# Patient Record
Sex: MALE | Race: WHITE | Employment: FULL TIME | URBAN - METROPOLITAN AREA
[De-identification: names, ages, dates, MRNs, and addresses within clinical notes are randomized per-mention and may not be internally consistent; named-entity substitution may affect disease eponyms.]

---

## 2018-03-20 ENCOUNTER — APPOINTMENT (OUTPATIENT)
Dept: OCCUPATIONAL MEDICINE | Age: 33
End: 2018-03-20
Attending: FAMILY MEDICINE

## 2022-01-05 ENCOUNTER — HOSPITAL ENCOUNTER (EMERGENCY)
Facility: HOSPITAL | Age: 37
Discharge: HOME | End: 2022-01-05
Attending: EMERGENCY MEDICINE
Payer: COMMERCIAL

## 2022-01-05 ENCOUNTER — APPOINTMENT (OUTPATIENT)
Dept: GENERAL RADIOLOGY | Facility: HOSPITAL | Age: 37
End: 2022-01-05
Attending: EMERGENCY MEDICINE
Payer: COMMERCIAL

## 2022-01-05 VITALS
HEIGHT: 71 IN | TEMPERATURE: 97 F | RESPIRATION RATE: 18 BRPM | DIASTOLIC BLOOD PRESSURE: 97 MMHG | BODY MASS INDEX: 32.2 KG/M2 | WEIGHT: 230 LBS | OXYGEN SATURATION: 96 % | HEART RATE: 91 BPM | SYSTOLIC BLOOD PRESSURE: 143 MMHG

## 2022-01-05 DIAGNOSIS — J18.9 COMMUNITY ACQUIRED PNEUMONIA, UNSPECIFIED LATERALITY: Primary | ICD-10-CM

## 2022-01-05 PROCEDURE — 99284 EMERGENCY DEPT VISIT MOD MDM: CPT

## 2022-01-05 PROCEDURE — 71045 X-RAY EXAM CHEST 1 VIEW: CPT | Performed by: EMERGENCY MEDICINE

## 2022-01-05 RX ORDER — LEVOFLOXACIN 750 MG/1
750 TABLET ORAL DAILY
Qty: 10 TABLET | Refills: 0 | Status: SHIPPED | OUTPATIENT
Start: 2022-01-05 | End: 2022-01-15

## 2022-01-05 NOTE — ED PROVIDER NOTES
Patient Seen in: [facility] AND [facility] Emergency Department      History   Patient presents with:  Cough/URI    Stated Complaint: URI    Subjective:   HPI    Pt is 32 yo unvaccinated male who p/w cough x 2 days.  States exposed to covid \"a week or so\" ago a results of the test are available. -Advised patient to inform their immediate household/contacts that they may wish to be tested and quarantine as well. Reviewed locations and people they have been in contact with in the past two weeks.   -Reviewed the sig

## 2022-01-06 LAB — SARS-COV-2 RNA RESP QL NAA+PROBE: DETECTED

## 2023-03-27 ENCOUNTER — OFFICE VISIT (OUTPATIENT)
Dept: INTERNAL MEDICINE CLINIC | Facility: CLINIC | Age: 38
End: 2023-03-27
Payer: COMMERCIAL

## 2023-03-27 VITALS
HEIGHT: 71 IN | OXYGEN SATURATION: 98 % | SYSTOLIC BLOOD PRESSURE: 148 MMHG | WEIGHT: 249 LBS | DIASTOLIC BLOOD PRESSURE: 92 MMHG | HEART RATE: 110 BPM | BODY MASS INDEX: 34.86 KG/M2 | RESPIRATION RATE: 17 BRPM

## 2023-03-27 DIAGNOSIS — E66.09 CLASS 1 OBESITY DUE TO EXCESS CALORIES WITH SERIOUS COMORBIDITY AND BODY MASS INDEX (BMI) OF 34.0 TO 34.9 IN ADULT: ICD-10-CM

## 2023-03-27 DIAGNOSIS — I10 PRIMARY HYPERTENSION: ICD-10-CM

## 2023-03-27 DIAGNOSIS — F41.1 GENERALIZED ANXIETY DISORDER: ICD-10-CM

## 2023-03-27 DIAGNOSIS — E78.2 MIXED HYPERLIPIDEMIA: ICD-10-CM

## 2023-03-27 DIAGNOSIS — G89.29 CHRONIC BILATERAL LOW BACK PAIN WITHOUT SCIATICA: ICD-10-CM

## 2023-03-27 DIAGNOSIS — M54.50 CHRONIC BILATERAL LOW BACK PAIN WITHOUT SCIATICA: ICD-10-CM

## 2023-03-27 DIAGNOSIS — K21.9 GASTROESOPHAGEAL REFLUX DISEASE WITHOUT ESOPHAGITIS: ICD-10-CM

## 2023-03-27 DIAGNOSIS — R41.840 DIFFICULTY CONCENTRATING: ICD-10-CM

## 2023-03-27 DIAGNOSIS — Z00.00 HEALTH MAINTENANCE EXAMINATION: Primary | ICD-10-CM

## 2023-03-27 DIAGNOSIS — R19.5 LOOSE STOOLS: ICD-10-CM

## 2023-03-27 PROBLEM — E66.811 CLASS 1 OBESITY DUE TO EXCESS CALORIES WITH SERIOUS COMORBIDITY AND BODY MASS INDEX (BMI) OF 34.0 TO 34.9 IN ADULT: Status: ACTIVE | Noted: 2023-03-27

## 2023-03-27 RX ORDER — LOSARTAN POTASSIUM 25 MG/1
25 TABLET ORAL DAILY
Qty: 90 TABLET | Refills: 0 | Status: SHIPPED | OUTPATIENT
Start: 2023-03-27

## 2023-03-27 RX ORDER — FAMOTIDINE 20 MG/1
20 TABLET, FILM COATED ORAL 2 TIMES DAILY
COMMUNITY

## 2023-03-27 NOTE — ASSESSMENT & PLAN NOTE
Patient with intermittent nausea and loose stools. This has been going on for at least a year. Possible history of IBS seen on prior chart review. Advise monitoring for food triggers for now  Avoid dairy products. Needs to get anxiety under better control.   Check labs such as CBC, CMP, celiac screen, TSH  I will refer to GI for further evaluation

## 2023-03-27 NOTE — ASSESSMENT & PLAN NOTE
Patient with a history of chronic lower back pain and intermittent lower extremity muscle spasms associated with the lower back pain. He reports that he has had a history of laminectomy as well as TLIF surgery. Pain is overall controlled at this time. Can use Tylenol Advil as needed, however he is not needing. We will refer to physical therapy at this time. Follow-up as needed.

## 2023-03-27 NOTE — ASSESSMENT & PLAN NOTE
Exercise and diet advised. CBC, CMP, lipid panel, Hba1c, TSH, HIV screen, hepatitis C screen  Flu shot today - declines. Advanced directive information provided. Advised COVID vaccine - declines.

## 2023-03-27 NOTE — ASSESSMENT & PLAN NOTE
Blood pressure elevated in the office, known history of hypertension not on any medications. Start losartan 25 mg daily. If blood pressure greater than 140/90 after 5 days advise increasing dosage to 50 mg daily. Follow-up with me in 2 weeks to reassess.

## 2023-03-27 NOTE — PATIENT INSTRUCTIONS
PATIENT INSTRUCTIONS    Thank you for seeing me today, it was a pleasure taking care of you. Please check out at the  and schedule a follow up appointment. Return in about 2 weeks (around 4/10/2023) for follow up. Please get your labs drawn - you may need to schedule a lab appointment if this was not completed at your recent doctor's visit. The following imaging studies were ordered: None  Please also follow up with the following specialists: Physical therapy, GI   Please fill out the advance directive information (power of  documents) and bring a copy to our clinic. Please monitor your GI symptoms, please try to keep a food log to see if there are any potential triggers   Consider cutting out all dairy products for the next 2 weeks   Monitor stress and anxiety level   Please monitor for a call from [facility]. Someone will reach out to you to speak with you. If you do not hear a response from [facility] in 48 hours, please call them at [phone]. Ask them for referrals to see a specialist for ADHD evaluation. After you are evaluated by the specialist, if you do have a diagnosis consistent with ADHD, I can moving forward help you manage your medications. I require a letter stating from the specialist that you have a diagnosis consistent with ADHD in order to move forward. Losartan 25 mg daily  After 5 days of taking the medicaiton, if blood pressure consistently >140/90 start taking two (total 50 mg daily)   Please monitor your blood pressures at home using a blood pressure machine with a cuff that goes over your arm (not your wrist). If you do not have a blood pressure machine, you can consider purchasing an [employer] blood pressure machine (available on [address]). Please check your blood pressures at once a day and record your blood pressures in a log. Please bring your blood pressure log to your next doctor's appointment with me.   Please also bring your blood pressure machine to your next doctor's appointment to see if it is accurate.         Best,   Dr. Poon Found

## 2023-03-27 NOTE — ASSESSMENT & PLAN NOTE
Patient with what seems consistent with generalized anxiety disorder-he reports that he is anxious at baseline and has had recent stressors that have been causing him to feel more anxious. Also with difficulty focusing. Will refer to psychiatry-STEM program to evaluate for ADHD and initiate medication if appropriate. Discussed with patient that he may benefit from getting his anxiety under better control-he will think about options for treatment such as therapy or possible medication.   Follow-up in 2 weeks to reassess

## 2023-03-27 NOTE — ASSESSMENT & PLAN NOTE
Patient with severe acid reflux. Takes famotidine on a regular basis. Discussed that he may benefit from H. pylori testing but he is unable to come off of antacids.   We will refer to GI for further evaluation

## 2023-03-29 PROBLEM — R73.03 PREDIABETES: Status: ACTIVE | Noted: 2023-03-29

## 2023-03-29 LAB
ABSOLUTE BASOPHILS: 73 CELLS/UL (ref 0–200)
ABSOLUTE EOSINOPHILS: 454 CELLS/UL (ref 15–500)
ABSOLUTE LYMPHOCYTES: 1960 CELLS/UL (ref 850–3900)
ABSOLUTE MONOCYTES: 729 CELLS/UL (ref 200–950)
ABSOLUTE NEUTROPHILS: 4884 CELLS/UL (ref 1500–7800)
ALBUMIN/GLOBULIN RATIO: 1.6 (CALC) (ref 1–2.5)
ALBUMIN: 4.5 G/DL (ref 3.6–5.1)
ALKALINE PHOSPHATASE: 102 U/L (ref 36–130)
ALT: 28 U/L (ref 9–46)
AST: 18 U/L (ref 10–40)
BASOPHILS: 0.9 %
BILIRUBIN, TOTAL: 0.3 MG/DL (ref 0.2–1.2)
BUN: 11 MG/DL (ref 7–25)
CALCIUM: 9.7 MG/DL (ref 8.6–10.3)
CARBON DIOXIDE: 26 MMOL/L (ref 20–32)
CHLORIDE: 104 MMOL/L (ref 98–110)
CHOL/HDLC RATIO: 5.2 (CALC)
CHOLESTEROL, TOTAL: 219 MG/DL
CREATININE: 1.04 MG/DL (ref 0.6–1.26)
EGFR: 96 ML/MIN/1.73M2
EOSINOPHILS: 5.6 %
GLOBULIN: 2.8 G/DL (CALC) (ref 1.9–3.7)
GLUCOSE: 118 MG/DL (ref 65–99)
HDL CHOLESTEROL: 42 MG/DL
HEMATOCRIT: 46.3 % (ref 38.5–50)
HEMOGLOBIN A1C: 5.9 % OF TOTAL HGB
HEMOGLOBIN: 14.8 G/DL (ref 13.2–17.1)
IMMUNOGLOBULIN A: 435 MG/DL (ref 47–310)
LDL-CHOLESTEROL: 137 MG/DL (CALC)
LYMPHOCYTES: 24.2 %
MCH: 28 PG (ref 27–33)
MCHC: 32 G/DL (ref 32–36)
MCV: 87.7 FL (ref 80–100)
MONOCYTES: 9 %
MPV: 11.5 FL (ref 7.5–12.5)
NEUTROPHILS: 60.3 %
NON-HDL CHOLESTEROL: 177 MG/DL (CALC)
PLATELET COUNT: 343 THOUSAND/UL (ref 140–400)
POTASSIUM: 4.6 MMOL/L (ref 3.5–5.3)
PROTEIN, TOTAL: 7.3 G/DL (ref 6.1–8.1)
RDW: 13 % (ref 11–15)
RED BLOOD CELL COUNT: 5.28 MILLION/UL (ref 4.2–5.8)
SIGNAL TO CUT-OFF: 0.08
SODIUM: 139 MMOL/L (ref 135–146)
TISSUE TRANSGLUTAMINASE$ANTIBODY, IGA: <1 U/ML
TRIGLYCERIDES: 257 MG/DL
TSH W/REFLEX TO FT4: 1.02 MIU/L (ref 0.4–4.5)
WHITE BLOOD CELL COUNT: 8.1 THOUSAND/UL (ref 3.8–10.8)

## 2023-04-11 ENCOUNTER — OFFICE VISIT (OUTPATIENT)
Dept: INTERNAL MEDICINE CLINIC | Facility: CLINIC | Age: 38
End: 2023-04-11
Payer: COMMERCIAL

## 2023-04-11 VITALS
SYSTOLIC BLOOD PRESSURE: 132 MMHG | BODY MASS INDEX: 34.86 KG/M2 | DIASTOLIC BLOOD PRESSURE: 82 MMHG | RESPIRATION RATE: 16 BRPM | OXYGEN SATURATION: 99 % | WEIGHT: 249 LBS | HEART RATE: 97 BPM | HEIGHT: 71 IN

## 2023-04-11 DIAGNOSIS — E66.09 CLASS 1 OBESITY DUE TO EXCESS CALORIES WITH SERIOUS COMORBIDITY AND BODY MASS INDEX (BMI) OF 34.0 TO 34.9 IN ADULT: ICD-10-CM

## 2023-04-11 DIAGNOSIS — I10 PRIMARY HYPERTENSION: Primary | ICD-10-CM

## 2023-04-11 DIAGNOSIS — R19.5 LOOSE STOOLS: ICD-10-CM

## 2023-04-11 DIAGNOSIS — K21.9 GASTROESOPHAGEAL REFLUX DISEASE WITHOUT ESOPHAGITIS: ICD-10-CM

## 2023-04-11 DIAGNOSIS — R73.03 PREDIABETES: ICD-10-CM

## 2023-04-11 DIAGNOSIS — R41.840 DIFFICULTY CONCENTRATING: ICD-10-CM

## 2023-04-11 DIAGNOSIS — E78.2 MIXED HYPERLIPIDEMIA: ICD-10-CM

## 2023-04-11 DIAGNOSIS — F41.1 GENERALIZED ANXIETY DISORDER: ICD-10-CM

## 2023-04-11 DIAGNOSIS — R00.0 TACHYCARDIA: ICD-10-CM

## 2023-04-11 LAB
ATRIAL RATE: 89 BPM
P AXIS: 55 DEGREES
P-R INTERVAL: 124 MS
Q-T INTERVAL: 350 MS
QRS DURATION: 94 MS
QTC CALCULATION (BEZET): 425 MS
R AXIS: 52 DEGREES
T AXIS: 36 DEGREES
VENTRICULAR RATE: 89 BPM

## 2023-04-11 PROCEDURE — 93000 ELECTROCARDIOGRAM COMPLETE: CPT | Performed by: FAMILY MEDICINE

## 2023-04-11 PROCEDURE — 3008F BODY MASS INDEX DOCD: CPT | Performed by: FAMILY MEDICINE

## 2023-04-11 PROCEDURE — 3079F DIAST BP 80-89 MM HG: CPT | Performed by: FAMILY MEDICINE

## 2023-04-11 PROCEDURE — 3075F SYST BP GE 130 - 139MM HG: CPT | Performed by: FAMILY MEDICINE

## 2023-04-11 PROCEDURE — 99214 OFFICE O/P EST MOD 30 MIN: CPT | Performed by: FAMILY MEDICINE

## 2023-04-11 RX ORDER — ESCITALOPRAM OXALATE 5 MG/1
5 TABLET ORAL DAILY
Qty: 90 TABLET | Refills: 0 | Status: SHIPPED | OUTPATIENT
Start: 2023-04-11

## 2023-04-11 RX ORDER — LOSARTAN POTASSIUM 25 MG/1
25 TABLET ORAL DAILY
Qty: 90 TABLET | Refills: 3 | Status: SHIPPED | OUTPATIENT
Start: 2023-04-11

## 2023-04-11 NOTE — PATIENT INSTRUCTIONS
PATIENT INSTRUCTIONS    Thank you for seeing me today, it was a pleasure taking care of you. Please check out at the  and schedule a follow up appointment. Return in about 1 month (around 5/11/2023) for follow up - ok for video visit. Get in touch with GI ---  Abran Gambino MD [address] [place] [address] [phone]     Focus on cutting back dairy for now  Continue lifestyle changes for acid reflux  Consider getting in touch with Natalia Shipley for evaluation of ADHD   Start escitalopram 5 mg daily   Focus on diet and exercise   [employer]. com  Monitor heart rate for now    Best,  Dr. Brie Malave

## 2023-04-11 NOTE — ASSESSMENT & PLAN NOTE
Patient with what seems consistent with generalized anxiety disorder-he reports that he is anxious at baseline and has had recent stressors that have been causing him to feel more anxious. Interested in medication therapy - start escitalopram 5 mg daily. Discussed that he can consider therapy in the future as well. Also with difficulty focusing. Referred to psychiatry-STEM program to evaluate for ADHD and initiate medication if appropriate.   Follow-up in 4 weeks to reassess

## 2023-04-11 NOTE — ASSESSMENT & PLAN NOTE
Check EKG today. Could be anxiety related. Monitor heart rate at home.    Consider cardiology evaluation if persistent

## 2023-04-11 NOTE — ASSESSMENT & PLAN NOTE
Patient with intermittent nausea and loose stools. This has been going on for at least a year. Possible history of IBS seen on prior chart review. Advise monitoring for food triggers for now  Avoid dairy products. Needs to get anxiety under better control.   I will refer to GI for further evaluation

## 2023-05-15 ENCOUNTER — OFFICE VISIT (OUTPATIENT)
Dept: INTERNAL MEDICINE CLINIC | Facility: CLINIC | Age: 38
End: 2023-05-15
Payer: COMMERCIAL

## 2023-05-15 VITALS
RESPIRATION RATE: 16 BRPM | OXYGEN SATURATION: 99 % | DIASTOLIC BLOOD PRESSURE: 86 MMHG | BODY MASS INDEX: 32.62 KG/M2 | HEART RATE: 103 BPM | SYSTOLIC BLOOD PRESSURE: 136 MMHG | WEIGHT: 233 LBS | HEIGHT: 71 IN

## 2023-05-15 DIAGNOSIS — R00.0 TACHYCARDIA: ICD-10-CM

## 2023-05-15 DIAGNOSIS — I10 PRIMARY HYPERTENSION: Primary | ICD-10-CM

## 2023-05-15 DIAGNOSIS — R19.5 LOOSE STOOLS: ICD-10-CM

## 2023-05-15 DIAGNOSIS — R41.840 DIFFICULTY CONCENTRATING: ICD-10-CM

## 2023-05-15 DIAGNOSIS — F41.1 GENERALIZED ANXIETY DISORDER: ICD-10-CM

## 2023-05-15 PROCEDURE — 3079F DIAST BP 80-89 MM HG: CPT | Performed by: FAMILY MEDICINE

## 2023-05-15 PROCEDURE — 3075F SYST BP GE 130 - 139MM HG: CPT | Performed by: FAMILY MEDICINE

## 2023-05-15 PROCEDURE — 3008F BODY MASS INDEX DOCD: CPT | Performed by: FAMILY MEDICINE

## 2023-05-15 PROCEDURE — 99214 OFFICE O/P EST MOD 30 MIN: CPT | Performed by: FAMILY MEDICINE

## 2023-05-15 RX ORDER — ESCITALOPRAM OXALATE 10 MG/1
10 TABLET ORAL DAILY
Qty: 90 TABLET | Refills: 0 | Status: SHIPPED | OUTPATIENT
Start: 2023-05-15

## 2023-05-15 NOTE — PATIENT INSTRUCTIONS
PATIENT INSTRUCTIONS    Thank you for seeing me today, it was a pleasure taking care of you. Please check out at the  and schedule a follow up appointment. Return in about 1 month (around 6/15/2023) for follow up.   Can be a video visit if desired   Increase escitalopram to 10 mg daily   Monitor side effects - libido  Continue losartan 25 mg - blood pressure  Consider therapy  Consider seeing [facility] for evaluation of ADHD     [place],  Dr. Rich Marvin

## 2023-05-15 NOTE — ASSESSMENT & PLAN NOTE
Patient with intermittent nausea and loose stools. This has been going on for at least a year and finally starting to improve at this time with anxiety control.   Suspect IBS

## 2023-05-15 NOTE — ASSESSMENT & PLAN NOTE
Anxiety is improving at this time. Increase escitalopram to 10 mg daily. Monitor libido for now- if significantly decreased consider decreasing escitalopram back to 5 mg and starting buspirone. Consider therapy. With difficulty focusing-has been referred to [facility] navigator for [employer] evaluation.   Follow-up in 4 weeks to reassess

## 2023-05-15 NOTE — ASSESSMENT & PLAN NOTE
Anxiety is improving at this time. Increase escitalopram to 10 mg daily. Monitor libido for now- if significantly decreased consider decreasing escitalopram back to 5 mg and starting buspirone. Consider therapy. With difficulty focusing-has been referred to [facility] for [employer] evaluation.   Follow-up in 4 weeks to reassess

## 2023-06-01 ENCOUNTER — TELEPHONE (OUTPATIENT)
Dept: INTERNAL MEDICINE CLINIC | Facility: CLINIC | Age: 38
End: 2023-06-01

## 2023-06-01 NOTE — TELEPHONE ENCOUNTER
Patient called as he was taken by ambulance on Monday, 5/29. His sciatica was so painful he couldn't stand. Has a bulging disk. That is why the ambulance was called. He has an appointment to see a surgeon next Wednesday. Patient is asking for a call back from the nurse. He is concerned with no mobility in his leg that he could form a blood clot. Doesn't want to go back to the ER if possible.

## 2023-06-01 NOTE — TELEPHONE ENCOUNTER
Call back to patient. Please see TE below. Patient went to ED for severe sciatic pain and states he is having surgery on his back for bulging disc next week. Patient states he is concerned that he will get a blood clot on his leg that is not as mobile as the other. States can bear weight but patient mostly limps when walking. Please advise. Thank you.

## 2023-06-01 NOTE — TELEPHONE ENCOUNTER
Thanks for the update. No need for anticoagulation at this time. Can see his surgeon for his chronic back issues, but I suspect he would likely benefit from physical therapy. I have referred him to PT in the past, you can let him know that he can feel free to schedule an appointment.     Thanks,  Jona Dakins

## 2023-06-15 ENCOUNTER — OFFICE VISIT (OUTPATIENT)
Dept: INTERNAL MEDICINE CLINIC | Facility: CLINIC | Age: 38
End: 2023-06-15
Payer: COMMERCIAL

## 2023-06-15 VITALS
WEIGHT: 229 LBS | DIASTOLIC BLOOD PRESSURE: 80 MMHG | SYSTOLIC BLOOD PRESSURE: 134 MMHG | BODY MASS INDEX: 32.06 KG/M2 | HEART RATE: 103 BPM | RESPIRATION RATE: 17 BRPM | HEIGHT: 71 IN | OXYGEN SATURATION: 99 %

## 2023-06-15 DIAGNOSIS — R00.0 TACHYCARDIA: ICD-10-CM

## 2023-06-15 DIAGNOSIS — M54.40 CHRONIC BILATERAL LOW BACK PAIN WITH SCIATICA, SCIATICA LATERALITY UNSPECIFIED: ICD-10-CM

## 2023-06-15 DIAGNOSIS — Z01.818 PREOPERATIVE EXAMINATION: Primary | ICD-10-CM

## 2023-06-15 DIAGNOSIS — K21.9 GASTROESOPHAGEAL REFLUX DISEASE WITHOUT ESOPHAGITIS: ICD-10-CM

## 2023-06-15 DIAGNOSIS — F41.1 GENERALIZED ANXIETY DISORDER: ICD-10-CM

## 2023-06-15 DIAGNOSIS — G89.29 CHRONIC BILATERAL LOW BACK PAIN WITH SCIATICA, SCIATICA LATERALITY UNSPECIFIED: ICD-10-CM

## 2023-06-15 DIAGNOSIS — I10 PRIMARY HYPERTENSION: ICD-10-CM

## 2023-06-15 DIAGNOSIS — E78.2 MIXED HYPERLIPIDEMIA: ICD-10-CM

## 2023-06-15 PROBLEM — M54.41 CHRONIC BILATERAL LOW BACK PAIN WITH SCIATICA: Status: ACTIVE | Noted: 2023-03-27

## 2023-06-15 PROBLEM — M54.42 CHRONIC BILATERAL LOW BACK PAIN WITH SCIATICA: Status: ACTIVE | Noted: 2023-03-27

## 2023-06-15 LAB
ATRIAL RATE: 103 BPM
P AXIS: 66 DEGREES
P-R INTERVAL: 124 MS
Q-T INTERVAL: 336 MS
QRS DURATION: 86 MS
QTC CALCULATION (BEZET): 440 MS
R AXIS: 51 DEGREES
T AXIS: 43 DEGREES
VENTRICULAR RATE: 103 BPM

## 2023-06-15 PROCEDURE — 93000 ELECTROCARDIOGRAM COMPLETE: CPT | Performed by: FAMILY MEDICINE

## 2023-06-15 PROCEDURE — 3079F DIAST BP 80-89 MM HG: CPT | Performed by: FAMILY MEDICINE

## 2023-06-15 PROCEDURE — 3075F SYST BP GE 130 - 139MM HG: CPT | Performed by: FAMILY MEDICINE

## 2023-06-15 PROCEDURE — 3008F BODY MASS INDEX DOCD: CPT | Performed by: FAMILY MEDICINE

## 2023-06-15 PROCEDURE — 99214 OFFICE O/P EST MOD 30 MIN: CPT | Performed by: FAMILY MEDICINE

## 2023-06-15 RX ORDER — HYDROCODONE BITARTRATE AND ACETAMINOPHEN 5; 325 MG/1; MG/1
1 TABLET ORAL EVERY 6 HOURS PRN
Qty: 15 TABLET | Refills: 0 | Status: SHIPPED | OUTPATIENT
Start: 2023-06-15

## 2023-06-15 NOTE — ASSESSMENT & PLAN NOTE
Patient with a history of chronic lower back pain. He reports that he has had a history of laminectomy as well as TLIF surgery. Pain worsened recently and he reports that the bulging disc is worse at this time. Now with plans for surgery. Holding NSAIDs prior to surgery. Advised using tylenol 500 mg every 6 hours. Heating pad, lidocaine patches as needed. I will prescribe him norco 5-325 mg to use as needed for break through pain in substitution of the tylenol. #15 prescribed.

## 2023-06-15 NOTE — ASSESSMENT & PLAN NOTE
Patient with severe acid reflux. Takes famotidine on a regular basis. Discussed that he may benefit from H. pylori testing but he is unable to come off of antacids. Referred to GI for further evaluation previously. Hold famotidine prior to surgery.

## 2023-06-15 NOTE — ASSESSMENT & PLAN NOTE
EKG in the office showing sinus tachycardia. Has had normal EKG with normal rate in the recent past.   Suspect this is secondary to pain at this time. Monitor.

## 2023-06-15 NOTE — PATIENT INSTRUCTIONS
PATIENT INSTRUCTIONS    Thank you for seeing me today, it was a pleasure taking care of you. Please check out at the  and schedule a follow up appointment. Return if symptoms worsen or fail to improve.   Take tylenol 500 mg every 6 hours for pain  Avoid NSAIDs as advised by your surgery team  [facility] - can substitute for tylenol if pain is severe  Losartan - hold night prior to surgery   Lexapro - can hold night prior to surgery     Best,  Dr. Poon Found

## 2023-06-15 NOTE — ASSESSMENT & PLAN NOTE
Anxiety is improving. Doing well on escitalopram to 10 mg daily - continue  Monitor libido for now- if significantly decreased consider decreasing escitalopram back to 5 mg and starting buspirone. Consider therapy. With difficulty focusing-has been referred previously to [address] [place] [address] for [employer] evaluation.

## 2023-06-15 NOTE — ASSESSMENT & PLAN NOTE
Blood pressures are typically controlled with losartan 25 mg daily. Can hold night prior to surgery.

## 2023-06-20 LAB
ABSOLUTE BASOPHILS: 52 CELLS/UL (ref 0–200)
ABSOLUTE EOSINOPHILS: 287 CELLS/UL (ref 15–500)
ABSOLUTE LYMPHOCYTES: 2184 CELLS/UL (ref 850–3900)
ABSOLUTE MONOCYTES: 853 CELLS/UL (ref 200–950)
ABSOLUTE NEUTROPHILS: 5324 CELLS/UL (ref 1500–7800)
ALBUMIN/GLOBULIN RATIO: 1.4 (CALC) (ref 1–2.5)
ALBUMIN: 4 G/DL (ref 3.6–5.1)
ALKALINE PHOSPHATASE: 88 U/L (ref 36–130)
ALT: 30 U/L (ref 9–46)
APPEARANCE: CLEAR
AST: 16 U/L (ref 10–40)
BASOPHILS: 0.6 %
BILIRUBIN, TOTAL: 0.2 MG/DL (ref 0.2–1.2)
BILIRUBIN: NEGATIVE
BUN: 16 MG/DL (ref 7–25)
CALCIUM: 9.1 MG/DL (ref 8.6–10.3)
CARBON DIOXIDE: 28 MMOL/L (ref 20–32)
CHLORIDE: 105 MMOL/L (ref 98–110)
COLOR: YELLOW
CREATININE: 0.95 MG/DL (ref 0.6–1.26)
EGFR: 106 ML/MIN/1.73M2
EOSINOPHILS: 3.3 %
GLOBULIN: 2.8 G/DL (CALC) (ref 1.9–3.7)
GLUCOSE: 111 MG/DL (ref 65–139)
GLUCOSE: NEGATIVE
HEMATOCRIT: 42.3 % (ref 38.5–50)
HEMOGLOBIN: 13.4 G/DL (ref 13.2–17.1)
INR: 0.9
KETONES: NEGATIVE
LEUKOCYTE ESTERASE: NEGATIVE
LYMPHOCYTES: 25.1 %
MCH: 28.2 PG (ref 27–33)
MCHC: 31.7 G/DL (ref 32–36)
MCV: 89.1 FL (ref 80–100)
MONOCYTES: 9.8 %
MPV: 10.7 FL (ref 7.5–12.5)
NEUTROPHILS: 61.2 %
NITRITE: NEGATIVE
OCCULT BLOOD: NEGATIVE
PARTIAL THROMBOPLASTIN$TIME, ACTIVATED: 25 SEC (ref 23–32)
PH: 6.5 (ref 5–8)
PLATELET COUNT: 328 THOUSAND/UL (ref 140–400)
POTASSIUM: 4.5 MMOL/L (ref 3.5–5.3)
PROTEIN, TOTAL: 6.8 G/DL (ref 6.1–8.1)
PROTEIN: NEGATIVE
PT: 9.8 SEC (ref 9–11.5)
RDW: 14.7 % (ref 11–15)
RED BLOOD CELL COUNT: 4.75 MILLION/UL (ref 4.2–5.8)
SODIUM: 139 MMOL/L (ref 135–146)
SPECIFIC GRAVITY: 1.02 (ref 1–1.03)
WHITE BLOOD CELL COUNT: 8.7 THOUSAND/UL (ref 3.8–10.8)

## 2023-09-03 DIAGNOSIS — F41.1 GENERALIZED ANXIETY DISORDER: ICD-10-CM

## 2023-09-05 RX ORDER — ESCITALOPRAM OXALATE 10 MG/1
10 TABLET ORAL DAILY
Qty: 90 TABLET | Refills: 3 | Status: SHIPPED | OUTPATIENT
Start: 2023-09-05

## 2023-09-05 NOTE — TELEPHONE ENCOUNTER
A refill request was received for:  Requested Prescriptions     Pending Prescriptions Disp Refills    ESCITALOPRAM 10 MG Oral Tab [Pharmacy Med Name: ESCITALOPRAM 10MG TABLETS] 90 tablet 0     Sig: TAKE 1 TABLET(10 MG) BY MOUTH DAILY     Last refill date:  5/15/23    Last office visit: 6/15/23      No future appointments.

## 2023-11-29 ENCOUNTER — OFFICE VISIT (OUTPATIENT)
Dept: INTERNAL MEDICINE CLINIC | Facility: CLINIC | Age: 38
End: 2023-11-29
Payer: COMMERCIAL

## 2023-11-29 VITALS
DIASTOLIC BLOOD PRESSURE: 82 MMHG | TEMPERATURE: 98 F | OXYGEN SATURATION: 98 % | BODY MASS INDEX: 34.3 KG/M2 | HEART RATE: 123 BPM | HEIGHT: 71 IN | WEIGHT: 245 LBS | SYSTOLIC BLOOD PRESSURE: 130 MMHG

## 2023-11-29 DIAGNOSIS — H92.02 DISCOMFORT OF LEFT EAR: Primary | ICD-10-CM

## 2023-11-29 DIAGNOSIS — R00.0 TACHYCARDIA: ICD-10-CM

## 2023-11-29 DIAGNOSIS — G89.29 CHRONIC BILATERAL LOW BACK PAIN WITH SCIATICA, SCIATICA LATERALITY UNSPECIFIED: ICD-10-CM

## 2023-11-29 DIAGNOSIS — M54.40 CHRONIC BILATERAL LOW BACK PAIN WITH SCIATICA, SCIATICA LATERALITY UNSPECIFIED: ICD-10-CM

## 2023-11-29 DIAGNOSIS — F41.1 GENERALIZED ANXIETY DISORDER: ICD-10-CM

## 2023-11-29 DIAGNOSIS — B34.9 VIRAL SYNDROME: ICD-10-CM

## 2023-11-29 DIAGNOSIS — I10 PRIMARY HYPERTENSION: ICD-10-CM

## 2023-11-29 DIAGNOSIS — K21.9 GASTROESOPHAGEAL REFLUX DISEASE WITHOUT ESOPHAGITIS: ICD-10-CM

## 2023-11-29 PROCEDURE — 99214 OFFICE O/P EST MOD 30 MIN: CPT | Performed by: FAMILY MEDICINE

## 2023-11-29 PROCEDURE — 3008F BODY MASS INDEX DOCD: CPT | Performed by: FAMILY MEDICINE

## 2023-11-29 PROCEDURE — 3075F SYST BP GE 130 - 139MM HG: CPT | Performed by: FAMILY MEDICINE

## 2023-11-29 PROCEDURE — 3079F DIAST BP 80-89 MM HG: CPT | Performed by: FAMILY MEDICINE

## 2023-11-29 RX ORDER — PREGABALIN 50 MG/1
50 CAPSULE ORAL 3 TIMES DAILY
COMMUNITY
Start: 2023-07-24

## 2023-11-29 RX ORDER — FLUTICASONE PROPIONATE 50 MCG
2 SPRAY, SUSPENSION (ML) NASAL DAILY
Qty: 18.2 ML | Refills: 3 | Status: SHIPPED | OUTPATIENT
Start: 2023-11-29 | End: 2024-11-23

## 2023-11-29 NOTE — PATIENT INSTRUCTIONS
PATIENT INSTRUCTIONS    Thank you for seeing me today, it was a pleasure taking care of you. Please check out at the  and schedule a follow up appointment. Return in about 1 year (around 11/29/2024) for physical .  Please remember that the stefan period for all appointments is 5 minutes. This is to help maximize the amount of time that we can spend together at our visits.     Try fluticasone (flonase) nasal spray  Avoid qtips  Can try debrox   Stay well hydrated, tylenol as needed, lozenges, warm water, honey  Continue to see pain specialist for management of your back pain   Consider physical therapy     Best,  Dr. Brie Malave

## 2023-11-29 NOTE — ASSESSMENT & PLAN NOTE
He reports that his anxiety is overall controlled. Doing well on escitalopram to 10 mg daily - continue  With difficulty focusing-has been referred previously to [facility] for [employer] evaluation.

## 2023-11-29 NOTE — ASSESSMENT & PLAN NOTE
Patient with a history of chronic lower back pain. He reports that he has had a history of laminectomy as well as TLIF surgery. He also recently had left L4-L5 hemilaminectomy and microdiscectomy 6/2023  He does note that he continues to have pain at this time  Now seeing pain specialist.  On pregabalin 50 mg 3 times daily  Advised using tylenol 500 mg every 6 hours. Heating pad, lidocaine patches as needed.

## 2023-11-29 NOTE — ASSESSMENT & PLAN NOTE
Patient reports that his acid reflux is significantly improved at this time  He is making lifestyle modifications to help control this  Can use famotidine as needed moving forward.

## 2023-11-29 NOTE — ASSESSMENT & PLAN NOTE
Reports that heart rates are better at home. Can monitor for now. If persistent, consider cardiology evaluation.

## 2023-11-29 NOTE — ASSESSMENT & PLAN NOTE
Patient recently with a mild sore throat, consistent with pharyngitis  This seems consistent with a mild viral syndrome. He declines COVID testing at this time. Advised to stay well-hydrated  Can take Tylenol or Advil as needed for  Can use lozenges as needed  Follow-up as needed.

## 2023-11-29 NOTE — ASSESSMENT & PLAN NOTE
Patient with feelings of congestion in the left ear  There is some cerumen in the ear canal, however it is not completely impacted at this time and I am able to visualize tympanic membrane without any signs of infection. No signs of otitis media or externa. Advised that he avoid Q-tips  Can try over-the-counter Debrox to help better remove the cerumen. There may be a component of eustachian tube dysfunction for which I advised that he try fluticasone nasal spray for this. He also does note that he recently has gotten sick which may be partly contributing to his symptoms. Follow-up as needed.

## 2023-11-30 ENCOUNTER — HOSPITAL ENCOUNTER (OUTPATIENT)
Age: 38
Discharge: HOME | End: 2023-11-30
Payer: COMMERCIAL

## 2023-11-30 VITALS
HEART RATE: 104 BPM | OXYGEN SATURATION: 97 % | TEMPERATURE: 97 F | DIASTOLIC BLOOD PRESSURE: 86 MMHG | RESPIRATION RATE: 20 BRPM | SYSTOLIC BLOOD PRESSURE: 135 MMHG

## 2023-11-30 DIAGNOSIS — J02.0 STREP PHARYNGITIS: Primary | ICD-10-CM

## 2023-11-30 DIAGNOSIS — H92.03 OTALGIA OF BOTH EARS: ICD-10-CM

## 2023-11-30 LAB — S PYO AG THROAT QL: POSITIVE

## 2023-11-30 RX ORDER — AMOXICILLIN 875 MG/1
875 TABLET, COATED ORAL 2 TIMES DAILY
Qty: 20 TABLET | Refills: 0 | Status: SHIPPED | OUTPATIENT
Start: 2023-11-30 | End: 2023-12-10

## 2023-11-30 NOTE — DISCHARGE INSTRUCTIONS
Start the antibiotic as prescribed finish it completely take it with a probiotic or eat yogurt as some antibiotics cause upset stomach and diarrhea. Drink plenty of fluids warm tea with honey you may take ibuprofen or Tylenol for pain. Gargle with salt water 2-3 times per day and spit it out. After 24 to 48 hours get a new toothbrush so you do not reinfect yourself if you have certain utensils that you use every day wash them daily so you do not reinfect yourself. If you develop any new or worsening symptoms go to the nearest emergency department.

## 2024-04-24 ENCOUNTER — HOSPITAL ENCOUNTER (OUTPATIENT)
Age: 39
Discharge: HOME | End: 2024-04-24
Payer: COMMERCIAL

## 2024-04-24 VITALS
TEMPERATURE: 97 F | DIASTOLIC BLOOD PRESSURE: 76 MMHG | OXYGEN SATURATION: 98 % | HEART RATE: 105 BPM | RESPIRATION RATE: 16 BRPM | SYSTOLIC BLOOD PRESSURE: 148 MMHG

## 2024-04-24 DIAGNOSIS — H61.23 BILATERAL IMPACTED CERUMEN: Primary | ICD-10-CM

## 2024-04-24 PROCEDURE — 99213 OFFICE O/P EST LOW 20 MIN: CPT

## 2024-04-24 PROCEDURE — 69209 REMOVE IMPACTED EAR WAX UNI: CPT

## 2024-04-24 RX ORDER — OFLOXACIN 3 MG/ML
5 SOLUTION AURICULAR (OTIC) 2 TIMES DAILY
Qty: 5 ML | Refills: 0 | Status: SHIPPED | OUTPATIENT
Start: 2024-04-24 | End: 2024-05-01

## 2024-04-24 NOTE — ED PROVIDER NOTES
Patient Seen in: [facility]      History     Chief Complaint   Patient presents with    Ear Problem Pain     Stated Complaint: ear pain    Subjective:   Randolph is a 38-year-old male presenting to the immediate care complaining of ear pain.  Patient states that he has had intermittent ear pain for the past couple of months.  States it has been worse over the past few days.  Patient states that he feels like he has some muffled hearing.  Denies any recent cough, congestion, rhinorrhea or fever.  Patient states no injury or trauma.  No drainage from the ear.  States that he is otherwise feeling well.  He denies any other concerns or complaints.          Objective:   Past Medical History:    Gastroesophageal reflux disease without esophagitis    Generalized anxiety disorder    Lumbar disc herniation    Mixed hyperlipidemia    Primary hypertension              Past Surgical History:   Procedure Laterality Date    Laminectomy      Lumbar                Social History     Socioeconomic History    Marital status: Single   Tobacco Use    Smoking status: Former     Current packs/day: 0.00     Average packs/day: 0.5 packs/day for 10.0 years (5.0 ttl pk-yrs)     Types: Cigarettes     Start date: 3/13/2008     Quit date: 3/13/2018     Years since quittin.1     Passive exposure: Never    Smokeless tobacco: Never   Vaping Use    Vaping status: Every Day    Substances: Nicotine, Flavoring   Substance and Sexual Activity    Alcohol use: Yes     Comment: Once or twice a week - 2 beers    Drug use: Yes     Types: Cannabis    Sexual activity: Yes     Partners: Female   Social History Narrative    Relationships: Single    Children: 2 kids - Zach (13M) Zoe (2 months old F)    Pets: None    School: N/A    Work: Steel manufacturing - powder coating     Origin: Grew up here and in [place].     Interests: Spending time with kids.     Spiritual: Not Quaker, not spiritual              Review of Systems    Positive for  stated complaint: ear pain  Other systems are as noted in HPI.  Constitutional and vital signs reviewed.      All other systems reviewed and negative except as noted above.    Physical Exam     ED Triage Vitals [04/24/24 1416]   /76   Pulse 105   Resp 16   Temp 97 °F (36.1 °C)   Temp src Temporal   SpO2 98 %   O2 Device None (Room air)       Current:/76   Pulse 105   Temp 97 °F (36.1 °C) (Temporal)   Resp 16   SpO2 98%         Physical Exam  Vitals and nursing note reviewed.   Constitutional:       General: He is not in acute distress.     Appearance: Normal appearance. He is not ill-appearing, toxic-appearing or diaphoretic.   HENT:      Head: Normocephalic.      Right Ear: Tympanic membrane, ear canal and external ear normal. There is impacted cerumen.      Left Ear: Tympanic membrane, ear canal and external ear normal. There is impacted cerumen.      Nose: Nose normal.      Mouth/Throat:      Mouth: Mucous membranes are moist.      Pharynx: Oropharynx is clear.   Eyes:      Conjunctiva/sclera: Conjunctivae normal.   Cardiovascular:      Rate and Rhythm: Normal rate and regular rhythm.      Pulses: Normal pulses.      Heart sounds: Normal heart sounds.   Pulmonary:      Effort: Pulmonary effort is normal. No respiratory distress.      Breath sounds: Normal breath sounds. No stridor. No wheezing, rhonchi or rales.   Abdominal:      General: Abdomen is flat.   Musculoskeletal:         General: Normal range of motion.      Cervical back: Normal range of motion.   Skin:     General: Skin is warm.      Capillary Refill: Capillary refill takes less than 2 seconds.   Neurological:      General: No focal deficit present.      Mental Status: He is alert and oriented to person, place, and time.   Psychiatric:         Mood and Affect: Mood normal.         Behavior: Behavior normal.         Thought Content: Thought content normal.         Judgment: Judgment normal.              ED Course   Labs Reviewed - No  data to display         MDM                    Medical Decision Making  Multiple medical diagnoses were considered including but not limited to cerumen impaction, otitis media, otitis externa, otalgia.  Patient is well appearing, non-toxic and in no acute distress.  Vital signs are stable.   Patient presented with bilateral cerumen impaction without any other complaints or concerns.  Cerumen impaction irrigated with good results.  Bilateral TMs normal, canals clear following irrigation.  Bilateral canals have some mild erythema without any swelling or irritation following irrigation.  I did send a prescription for ofloxacin drops and discussed with the patient that if he develops any pain to the ear over the next 24 to 48 hours he should start the eardrops.  Recommended that if the patient develop any ear pain, drainage, redness surrounding the ear, fever or any other concerning complaints they should go to the ED.  Recommended the patient make an appointment to follow up with your primary care provider.  ED precautions discussed.  Patient advised to follow up with PCP in 2-3 days.  Patient agrees with this plan of care.  Patient verbalizes understanding of discharge instructions and plan of care.      Risk  OTC drugs.  Prescription drug management.        Disposition and Plan     Clinical Impression:  1. Bilateral impacted cerumen         Disposition:  Discharge  4/24/2024  2:52 pm    Follow-up:  Deep Goldstein MD  [address] [place] [address] [id number] [phone]                Medications Prescribed:  Discharge Medication List as of 4/24/2024  2:52 PM        START taking these medications    Details   ofloxacin 0.3 % Otic Solution Place 5 drops into both ears 2 (two) times daily for 7 days., Normal, Disp-5 mL, R-0

## 2024-04-24 NOTE — DISCHARGE INSTRUCTIONS
If you get persistent earwax buildup you can use Debrox over-the-counter earwax softening drops weekly to prevent buildup.  You can also gently flush the ear with a showerhead or a bulb syringe.  Do not use Q-tips.  If you develop any ear pain, drainage, redness surrounding the ear, fever or any other concerning complaints they should go to the ED.  Otherwise follow up with your primary care provider.  I did send you a prescription for antibiotic eardrops in case you develop any pain following the irrigation.  You can start these in the next 24 to 48 hours if you develop pain to the ear.

## 2024-06-21 ENCOUNTER — HOSPITAL ENCOUNTER (OUTPATIENT)
Age: 39
Discharge: HOME | End: 2024-06-21

## 2024-06-21 VITALS
TEMPERATURE: 101 F | DIASTOLIC BLOOD PRESSURE: 81 MMHG | HEART RATE: 111 BPM | OXYGEN SATURATION: 99 % | RESPIRATION RATE: 16 BRPM | SYSTOLIC BLOOD PRESSURE: 138 MMHG

## 2024-06-21 DIAGNOSIS — H65.92 FLUID LEVEL BEHIND TYMPANIC MEMBRANE OF LEFT EAR: Primary | ICD-10-CM

## 2024-06-21 DIAGNOSIS — B34.9 VIRAL ILLNESS: ICD-10-CM

## 2024-06-21 DIAGNOSIS — R50.9 FEVER IN ADULT: ICD-10-CM

## 2024-06-21 DIAGNOSIS — H73.892 ERYTHEMA OF TYMPANIC MEMBRANE OF LEFT EAR: ICD-10-CM

## 2024-06-21 LAB
POCT INFLUENZA A: NEGATIVE
POCT INFLUENZA B: NEGATIVE

## 2024-06-21 PROCEDURE — 99213 OFFICE O/P EST LOW 20 MIN: CPT | Performed by: NURSE PRACTITIONER

## 2024-06-21 PROCEDURE — 87502 INFLUENZA DNA AMP PROBE: CPT | Performed by: NURSE PRACTITIONER

## 2024-06-21 RX ORDER — AMOXICILLIN AND CLAVULANATE POTASSIUM 875; 125 MG/1; MG/1
1 TABLET, FILM COATED ORAL 2 TIMES DAILY
Qty: 20 TABLET | Refills: 0 | Status: SHIPPED | OUTPATIENT
Start: 2024-06-21 | End: 2024-07-01

## 2024-06-21 NOTE — DISCHARGE INSTRUCTIONS
Recommend starting over-the-counter Flonase and 24-hour Zyrtec to help with the fluid behind the left ear if you feel like there is more pressure or fluid behind the left ear or you get any pain in the next 24 hours start the antibiotic take it with a probiotic or eat yogurt as antibiotic may cause upset stomach and diarrhea and make sure food is on her stomach.  Continue Tylenol every 4-6 hours and Motrin every 6-8 hours as needed for fever comfort or pain drink plenty of fluids.  Recommend you follow-up with ears nose and throat specialist.    If you develop chest pain shortness of breath fever does not resolve with medication severe headache nausea vomiting diarrhea or any new or worsening symptoms neck stiffness go to the nearest emergency department.

## 2024-06-21 NOTE — ED PROVIDER NOTES
Patient Seen in: [facility]      History     Chief Complaint   Patient presents with    Fever    Cough/URI     Stated Complaint: Fever    Subjective:   HPI    This is a 39-year-old male presenting with fever cough head cold symptoms and a feeling of fluid in the left ear.  Patient states for about a year he is felt like he has had fluid in the left ear has been seen a couple different times at different places for the ear and it has always been a fluid buildup issue.  Patient states for few days he has had a fever cough head cold symptoms his daughter is sick at home who was tested for COVID flu RSV which were negative and she was diagnosed with croup.  Patient states he took Tylenol about 2 hours ago.  No chest pain shortness of breath nausea vomiting or diarrhea.    Objective:   No pertinent past medical history.            No pertinent past surgical history.              No pertinent social history.            Review of Systems    Positive for stated complaint: Fever  Other systems are as noted in HPI.  Constitutional and vital signs reviewed.      All other systems reviewed and negative except as noted above.    Physical Exam     ED Triage Vitals [06/21/24 1733]   /81   Pulse 111   Resp 16   Temp (!) 101.3 °F (38.5 °C)   Temp src Oral   SpO2 99 %   O2 Device None (Room air)       Current Vitals:   Vital Signs  BP: 138/81  Pulse: 111  Resp: 16  Temp: (!) 101.3 °F (38.5 °C)  Temp src: Oral    Oxygen Therapy  SpO2: 99 %  O2 Device: None (Room air)            Physical Exam  Vitals and nursing note reviewed.   Constitutional:       Appearance: Normal appearance.   HENT:      Right Ear: Tympanic membrane normal.      Ears:      Comments: Fluid level behind the left TM slightly erythematous but nonbulging canal is clear no mastoid TTP no pain with pulling of the tragus     Nose: Congestion present. No rhinorrhea.      Mouth/Throat:      Mouth: Mucous membranes are moist.      Pharynx: Oropharynx is  clear. No posterior oropharyngeal erythema.   Eyes:      Conjunctiva/sclera: Conjunctivae normal.   Cardiovascular:      Rate and Rhythm: Normal rate.   Pulmonary:      Effort: Pulmonary effort is normal. No respiratory distress.      Breath sounds: Normal breath sounds. No wheezing.   Musculoskeletal:         General: Normal range of motion.      Cervical back: Normal range of motion. No rigidity or tenderness.   Lymphadenopathy:      Cervical: No cervical adenopathy.   Skin:     General: Skin is warm.      Capillary Refill: Capillary refill takes less than 2 seconds.   Neurological:      General: No focal deficit present.      Mental Status: He is alert and oriented to person, place, and time.               ED Course     Labs Reviewed   POCT FLU TEST - Normal    Narrative:     This assay is a rapid molecular in vitro test utilizing nucleic acid amplification of influenza A and B viral RNA.       MDM               Medical Decision Making  39-year-old male nontoxic-appearing but febrile presenting with head cold symptoms and a fever sick contacts at home.  DDx viral illness versus OM versus OE versus otalgia versus eustachian tube dysfunction versus another viral illness versus COVID.  No clinical indication for labs or imaging patient with no concern for COVID is agreeable with flu swab.  Declining anything at this time for the fever recommended he take ibuprofen once he gets home.  Discussed with the patient we will prescribe Augmentin as there is concern that he may be developing an early ear infection on the left side discussed over the next 24 to 48 hours if his feeling of fluid in the ear becomes painful to start the antibiotic.  Discussed continuing ibuprofen and Tylenol at home for fever comfort or pain hydrating well eating as tolerated and discussed strict ER precautions with any new or worsening symptoms.  Also provided resources for ENT to follow-up with as he has had recurrent issues with the left ear  and may follow-up with primary care provider in 2 to 3 days.  Patient feels comfortable with this plan of care and acknowledges understanding discharge instructions.    Influenza negative patient made aware of results.    Problems Addressed:  Erythema of tympanic membrane of left ear: acute illness or injury  Fever in adult: acute illness or injury  Fluid level behind tympanic membrane of left ear: acute illness or injury  Viral illness: acute illness or injury    Amount and/or Complexity of Data Reviewed  Labs: ordered. Decision-making details documented in ED Course.    Risk  OTC drugs.  Prescription drug management.        Disposition and Plan     Clinical Impression:  1. Fluid level behind tympanic membrane of left ear    2. Erythema of tympanic membrane of left ear    3. Fever in adult    4. Viral illness         Disposition:  Discharge  6/21/2024  5:58 pm    Follow-up:  Deep Goldstein MD  [address] [place] [address] [id number] [phone]    Schedule an appointment as soon as possible for a visit in 3 days      Gianfranco Henry MD  [address] [place] [id number]  [phone]    Call   E[facility] N[facility] Thro[facility] specialist          Medications Prescribed:  Discharge Medication List as of 6/21/2024  5:59 PM        START taking these medications    Details   amoxicillin clavulanate 875-125 MG Oral Tab Take 1 tablet by mouth 2 (two) times daily for 10 days., Normal, Disp-20 tablet, R-0

## 2024-09-17 ENCOUNTER — TELEMEDICINE (OUTPATIENT)
Dept: TELEHEALTH | Age: 39
End: 2024-09-17
Payer: COMMERCIAL

## 2024-09-17 DIAGNOSIS — J06.9 URI WITH COUGH AND CONGESTION: Primary | ICD-10-CM

## 2024-09-17 DIAGNOSIS — Z02.89 ENCOUNTER TO OBTAIN EXCUSE FROM WORK: ICD-10-CM

## 2024-09-17 PROCEDURE — 99213 OFFICE O/P EST LOW 20 MIN: CPT | Performed by: PHYSICIAN ASSISTANT

## 2024-09-17 NOTE — PROGRESS NOTES
Virtual/Telephone Check-In    Randolph Reid verbally consents to a Virtual/Telephone Check-In service on 09/17/24.  Patient has been referred to the [facility] website at [email or web address] to review the yearly Consent to Treat document.  Patient understands and accepts financial responsibility for any deductible, co-insurance and/or co-pays associated with this service.       Telehealth Verbal Consent   I conducted a telehealth visit with Randolph Reid today, 09/17/24, which was completed using two-way, real-time interactive audio and video communication. This has been done in good kory to provide continuity of care in the best interest of the provider-patient relationship, due to the COVID - public health crisis/national emergency where restrictions of face-to-face office visits are ongoing. Every conscious effort was taken to allow for sufficient and adequate time to complete the visit.  The patient was made aware of the limitations of the telehealth visit, including treatment limitations as no physical exam could be performed.  The patient was advised to call 911 or to go to the ER in case there was an emergency.  The patient was also advised of the potential privacy & security concerns related to the telehealth platform.   The patient was made aware of where to find [facility]'s notice of privacy practices, telehealth consent form and other related consent forms and documents.  which are located on the [facility] website. The patient verbally agreed to telehealth consent form, related consents and the risks discussed.    Lastly, the patient confirmed that they were in [place].   Included in this visit, time may have been spent reviewing labs, medications, radiology tests and decision making. Appropriate medical decision-making and tests are ordered as detailed in the plan of care above.  Coding/billing information is submitted for this visit based on complexity of care and/or time spent for the visit.    CHIEF COMPLAINT:      Chief Complaint   Patient presents with    Upper Respiratory Infection       HPI:   Randolph Reid is a 39 year old male who presents for a video visit.  Patient reports feeling unwell since last night.  Reports sneezing and headache.  Reports sinus pressure in head and teeth. Mild sore throat.  Denies any NV.  Not feeling hungry.  No fever/chills. Daughter and girlfriend ill sick with URI sx.     Current Outpatient Medications   Medication Sig Dispense Refill    pregabalin 50 MG Oral Cap Take 1 capsule (50 mg total) by mouth 3 (three) times daily.      fluticasone propionate 50 MCG/ACT Nasal Suspension 2 sprays by Each Nare route daily. 18.2 mL 3    escitalopram 10 MG Oral Tab TAKE 1 TABLET(10 MG) BY MOUTH DAILY 90 tablet 3    losartan 25 MG Oral Tab Take 1 tablet (25 mg total) by mouth daily. 90 tablet 3      Past Medical History:    Gastroesophageal reflux disease without esophagitis    Generalized anxiety disorder    Lumbar disc herniation    Mixed hyperlipidemia    Primary hypertension      Past Surgical History:   Procedure Laterality Date    Laminectomy      Lumbar         Social History     Socioeconomic History    Marital status: Single   Tobacco Use    Smoking status: Former     Current packs/day: 0.00     Average packs/day: 0.5 packs/day for 10.0 years (5.0 ttl pk-yrs)     Types: Cigarettes     Start date: 3/13/2008     Quit date: 3/13/2018     Years since quittin.5     Passive exposure: Never    Smokeless tobacco: Never   Vaping Use    Vaping status: Every Day    Substances: Nicotine, Flavoring   Substance and Sexual Activity    Alcohol use: Yes     Comment: Once or twice a week - 2 beers    Drug use: Yes     Types: Cannabis    Sexual activity: Yes     Partners: Female   Social History Narrative    Relationships: Single    Children: 2 kids - Zach (13M) Zoe (2 months old F)    Pets: None    School: N/A    Work: Steel manufacturing - powder coating     Origin: Grew up here and in [place].      Interests: Spending time with kids.     Spiritual: Not Denominational, not spiritual         REVIEW OF SYSTEMS:   GENERAL: minimal appetite  SKIN: no rashes or abnormal skin lesions  HEENT: See HPI  LUNGS: denies shortness of breath or wheezing, See HPI  CARDIOVASCULAR: denies chest pain or palpitations   GI: denies N/V/C or abdominal pain      EXAM:   General: Alert, Well-appearing, and In no acute distress  Respiratory:   Speaking in full sentences comfortably  Normal work of breathing  No cough during visit  Head: Normocephalic  Nose: No obvious nasal discharge.  Skin: No obvious rashes or lesions from what observed.     No results found for this or any previous visit (from the past 24 hour(s)).    ASSESSMENT AND PLAN:   Randolph Reid is a 39 year old male who presents with symptoms that are consistent with    ASSESSMENT:   Encounter Diagnoses   Name Primary?    URI with cough and congestion Yes    Encounter to obtain excuse from work        PLAN: Encouraged home COVID testing.  Comfort care for viral infections discussed. Advised illness will last 10-14 days, coughs will linger often 1-2 weeks after this even.      Note for patient to excuse from work today given per patient request.       The patient indicates understanding of these issues and agrees to the plan.  The patient is asked to return if sx's persist or worsen.    Face to face time spent on Video Visit: 9:45  Total Time spent on visit including reviewing history, ordering labs/medication, patient examination and education: 15    Randolph Reid understands video visit evaluation is not a substitute for face-to-face examination or emergency care. Patient advised to go to ER or call 911 for worsening symptoms or acute distress.

## 2024-09-17 NOTE — PATIENT INSTRUCTIONS
Rest as much as possible   Drink lots of fluids-- aim to drink at least 60 ounces of water in addition to electrolyte replacement drinks.   You may take Ibuprofen 600 mg every 6 hours with a little food and you may add Acetaminophen 650 mg every 4 hours as needed for fever control and body aches.   Throat lozenges or hard candies to soothe sore throat and lessen cough   1 tsp of honey as needed to help coat throat and suppress cough   Warm salt water gargles to sooth sore throat   You can use your preferred OTC cold/cough medication just be sure to check the ingredients so you are not taking too much acetaminophen or ibuprofen.   If you have significant nasal congestion and no history of blood pressure issues you can take Sudafed (pseudoephedrine) 4-6 hours formulation behind the pharmacy counter    When you may have a respiratory virus...?  Stay home and away from others (including people you live with who are not sick) if you have respiratory virus symptoms that aren't better explained by another cause. These symptoms can include fever, chills, fatigue, cough, runny nose, and headache, among others.    You can go back to your normal activities when, for at least 24 hours, both are true:  Your symptoms are getting better overall, and  2. You have not had a fever (and are not using fever-reducing medication).    When you go back to your normal activities, take added precaution over the next 5 days, such as taking additional steps for  air, hygiene, masks, physical distancing, and/or testing when you will be around other people indoors.    Keep in mind that you may still be able to spread the virus that made you sick, even if you are feeling better. You are likely to be less contagious at this time, depending on factors like how long you were sick or how sick you were.    If you develop a fever or you start to feel worse after you have gone back to normal activities, stay home and away from others again until,  for at least 24 hours, both are true: your symptoms are improving overall, and you have not had a fever (and are not using fever-reducing medication). Then take added precaution for the next 5 days.

## 2024-11-01 ENCOUNTER — HOSPITAL ENCOUNTER (OUTPATIENT)
Age: 39
Discharge: HOME | End: 2024-11-01
Payer: COMMERCIAL

## 2024-11-01 VITALS
TEMPERATURE: 98 F | DIASTOLIC BLOOD PRESSURE: 82 MMHG | HEART RATE: 96 BPM | OXYGEN SATURATION: 100 % | SYSTOLIC BLOOD PRESSURE: 137 MMHG | RESPIRATION RATE: 16 BRPM

## 2024-11-01 DIAGNOSIS — S05.02XA ABRASION OF LEFT CORNEA, INITIAL ENCOUNTER: Primary | ICD-10-CM

## 2024-11-01 PROCEDURE — 99213 OFFICE O/P EST LOW 20 MIN: CPT | Performed by: NURSE PRACTITIONER

## 2024-11-01 RX ORDER — POLYMYXIN B SULFATE AND TRIMETHOPRIM 1; 10000 MG/ML; [USP'U]/ML
1 SOLUTION OPHTHALMIC
Qty: 10 ML | Refills: 0 | Status: SHIPPED | OUTPATIENT
Start: 2024-11-01 | End: 2024-11-06
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
## 2025-04-01 ENCOUNTER — HOSPITAL ENCOUNTER (OUTPATIENT)
Age: 40
Discharge: HOME | End: 2025-04-01
Payer: COMMERCIAL

## 2025-04-01 VITALS
RESPIRATION RATE: 16 BRPM | SYSTOLIC BLOOD PRESSURE: 130 MMHG | HEART RATE: 90 BPM | OXYGEN SATURATION: 98 % | TEMPERATURE: 99 F | DIASTOLIC BLOOD PRESSURE: 72 MMHG

## 2025-04-01 DIAGNOSIS — J34.89 SORE IN NOSE: Primary | ICD-10-CM

## 2025-04-01 PROCEDURE — 99213 OFFICE O/P EST LOW 20 MIN: CPT | Performed by: NURSE PRACTITIONER

## 2025-04-01 RX ORDER — MUPIROCIN 20 MG/G
1 OINTMENT TOPICAL 3 TIMES DAILY
Qty: 22 G | Refills: 0 | Status: SHIPPED | OUTPATIENT
Start: 2025-04-01 | End: 2025-04-08

## 2025-04-01 NOTE — ED PROVIDER NOTES
Patient Seen in: [facility]      History     Chief Complaint   Patient presents with    Nose Problem     Stated Complaint: nose pain  Subjective:   39-year-old male with no past medical history presents from home.  Patient is here with concern about a sore in his nose.  States he felt like he had a pimple to the right side of his nose and tried to pop it.  Since then has had several lesions inside the right nose, now some in the left nose.  Mild bleeding.  Tenderness.  No significant drainage.  No swelling.  No fever.  He has applied bag balm.  No other sores on his skin or in his mouth.  Generally feeling well.  Having some anxiety about an upcoming move but denies depression or suicidal ideation.  Denies history of MRSA.  He does vape.    The history is provided by the patient. No  was used.     Objective:   No pertinent past medical history.          No pertinent past surgical history.            No pertinent social history.          Review of Systems    Positive for stated complaint: Nose Problem    Other systems are as noted in HPI.  Constitutional and vital signs reviewed.      All other systems reviewed and negative except as noted above.    Physical Exam     ED Triage Vitals [04/01/25 0808]   /72   Pulse 90   Resp 16   Temp 98.7 °F (37.1 °C)   Temp src Oral   SpO2 98 %   O2 Device None (Room air)     Current:/72   Pulse 90   Temp 98.7 °F (37.1 °C) (Oral)   Resp 16   SpO2 98%     Physical Exam  Vitals and nursing note reviewed.   Constitutional:       General: He is not in acute distress.     Appearance: Normal appearance. He is not ill-appearing or toxic-appearing.   HENT:      Head: Normocephalic and atraumatic.      Nose: Nose normal.      Comments: No noted lesions, abscess, swelling, crusting, bleeding inside the nose. He does note mild discomfort on palpation inside the right nare. No facial swelling.      Mouth/Throat:      Mouth: Mucous membranes are  moist.      Pharynx: Oropharynx is clear.   Eyes:      Pupils: Pupils are equal, round, and reactive to light.   Cardiovascular:      Rate and Rhythm: Normal rate and regular rhythm.      Pulses: Normal pulses.   Pulmonary:      Effort: Pulmonary effort is normal. No respiratory distress.      Breath sounds: Normal breath sounds.      Comments: Lungs clear.  No adventitious lung sounds.  No distress.  No hypoxia.  Pulse ox 98% ra. Which is normal    Abdominal:      General: Abdomen is flat.      Palpations: Abdomen is soft.   Musculoskeletal:         General: No signs of injury. Normal range of motion.      Cervical back: Normal range of motion and neck supple.   Skin:     General: Skin is warm and dry.      Capillary Refill: Capillary refill takes less than 2 seconds.   Neurological:      General: No focal deficit present.      Mental Status: He is alert and oriented to person, place, and time.      GCS: GCS eye subscore is 4. GCS verbal subscore is 5. GCS motor subscore is 6.   Psychiatric:         Mood and Affect: Mood normal.         Behavior: Behavior normal.         Thought Content: Thought content normal.         Judgment: Judgment normal.         ED Course   Radiology:  No results found.  Labs Reviewed - No data to display    MDM     Medical Decision Making  Differential diagnoses reflecting the complexity of care include: Pimple inside nose, MRSA, ingrown hair, impetigo  Patient states that a pimple inside the right side of his nose, now with multiple tender lesions  Benign exam.  No wounds, lesions, abscess appreciated.  He does have mild tenderness.  No honey crusting.  No facial swelling or cellulitis  Patient is well-appearing  Will treat for MRSA versus impetigo  Patient does admit to some anxiety surrounding an upcoming move.  He does state that he googled his symptoms and he is concerned that he may have cancer.  Reassurance provided.  No worrisome lesions noted at this time.    Plan of Care: rx  mupirocin ointment.  He may continue to use the bag balm.  Recommend recheck with primary doctor or he may consider seeing ENT.    Results and plan of care discussed with the patient/family. They are in agreement with discharge. They understand to follow up with their primary doctor or the referral physician for further evaluation, especially if no improvement.  Also discussed the limitations of immediate care, patient is aware that if symptoms are worse they should go to the emergency room. Verbal and written discharge instructions were given.     My independent interpretation of studies of: N/A  Diagnostic tests and medications considered but not ordered were: Wound culture   Shared decision making was done by: patient agreeable to treatment with topical antibiotics  Comorbidities that add complexity to management include: None  External chart review was done and was noted: Reviewed, noncontributory  History obtained by an independent source was from: N/A  Discussions and management was done with: N/A  Social determinants of health that affect care: Upcoming move              Problems Addressed:  Sore in nose: acute illness or injury    Risk  OTC drugs.  Prescription drug management.        Disposition and Plan     Clinical Impression:  1. Sore in nose         Disposition:  Discharge  4/1/2025  8:17 am    Follow-up:  Deep Goldstein MD  [address] [facility] [address]  [phone]          Gianfranco Henry MD  [address] [address]  [phone]                Medications Prescribed:  Current Discharge Medication List        START taking these medications    Details   mupirocin 2 % External Ointment Apply 1 Application topically 3 (three) times daily for 7 days.  Qty: 22 g, Refills: 0

## 2025-04-01 NOTE — DISCHARGE INSTRUCTIONS
Apply the topical antibiotic ointment 3 times a day. Continue to use the bag balm.  Recheck with your primary doctor or the nose specialist if no improvement

## (undated) NOTE — LETTER
Date & Time: 11/1/2024, 11:48 AM  Patient: Randolph Reid  Encounter Provider(s):    Ruba Beasley APRN       To Whom It May Concern:    Randolph Reid was seen and treated in our department on 11/1/2024. He should not return to work until 11/2/2024 .    If you have any questions or concerns, please do not hesitate to call.        _____________________________  Physician/APC Signature

## (undated) NOTE — LETTER
[facility], VIRTUAL VISIT  [address] [facility] [address]  [phone]          09/17/24    Randolph Reid  6/9/1985        This document is to verify Randolph Reid was seen for evaluation and treatment.    Please excuse the patient from work for the follow date(s):  09/17/24.      Please call the number listed above if you have further questions.        Thank you,         Janell Bach PA-C

## (undated) NOTE — LETTER
Date & Time: 4/1/2025, 8:26 AM  Patient: Randolph Reid  Encounter Provider(s):    Izabela Phoenix APRN       To Whom It May Concern:    Randolph Reid was seen and treated in our department on 4/1/2025.    If you have any questions or concerns, please do not hesitate to call.        _____________________________  Physician/APC Signature

## (undated) NOTE — LETTER
Date: 11/29/2023    Patient Name: Benjamin Gardenr          To Whom it may concern: This letter has been written at the patient's request. The above patient was seen at the [facility] for treatment of a medical condition. This patient should be excused from attending work today 11/29/23.          Sincerely,       Sumi Solano MD

## (undated) NOTE — LETTER
Date & Time: 11/30/2023, 1:33 PM  Patient: Laurita Brewer  Encounter Provider(s):    MARIA A Sommer       To Whom It May Concern:    Laurita Brewer was seen and treated in our department on 11/30/2023. He should not return to work until 12/02/2023 . If you have any questions or concerns, please do not hesitate to call.     LEXI Martino    _____________________________  PAIGE/FPG Signature

## (undated) NOTE — LETTER
Date & Time: 4/24/2024, 2:52 PM  Patient: Randolph Reid  Encounter Provider(s):    Karine Barajas APRN       To Whom It May Concern:    Randolph Reid was seen and treated in our department on 4/24/2024. He should not return to work until 4/25/2024 .    If you have any questions or concerns, please do not hesitate to call.        _____________________________  MARIA A Chandra

## (undated) NOTE — LETTER
Date: 4/11/2023    Patient Name: Bryan Hyatt          To Whom it may concern: This letter has been written at the patient's request. The above patient was seen at the [facility] for treatment of a medical condition. This patient should be excused from attending work/school today 4/11/23.         Sincerely,       Francisco J Toscano MD

## (undated) NOTE — LETTER
Date: 5/15/2023    Patient Name: Vickie Cohen          To Whom it may concern: This letter has been written at the patient's request. The above patient was seen at the [facility] for treatment of a medical condition.     This patient should be excused from attending work 5/15/23        Sincerely,       Darrell Soto MD